# Patient Record
Sex: MALE | Race: WHITE | NOT HISPANIC OR LATINO | Employment: FULL TIME | URBAN - METROPOLITAN AREA
[De-identification: names, ages, dates, MRNs, and addresses within clinical notes are randomized per-mention and may not be internally consistent; named-entity substitution may affect disease eponyms.]

---

## 2017-01-26 ENCOUNTER — TRANSFERRED RECORDS (OUTPATIENT)
Dept: HEALTH INFORMATION MANAGEMENT | Facility: CLINIC | Age: 22
End: 2017-01-26

## 2017-10-23 ENCOUNTER — ALLIED HEALTH/NURSE VISIT (OUTPATIENT)
Dept: FAMILY MEDICINE | Facility: CLINIC | Age: 22
End: 2017-10-23
Payer: OTHER GOVERNMENT

## 2017-10-23 DIAGNOSIS — Z23 NEED FOR PROPHYLACTIC VACCINATION AND INOCULATION AGAINST INFLUENZA: Primary | ICD-10-CM

## 2017-10-23 PROCEDURE — 99207 ZZC NO CHARGE NURSE ONLY: CPT

## 2017-10-23 PROCEDURE — 90686 IIV4 VACC NO PRSV 0.5 ML IM: CPT

## 2017-10-23 PROCEDURE — 90471 IMMUNIZATION ADMIN: CPT

## 2017-10-23 NOTE — PROGRESS NOTES

## 2017-10-23 NOTE — MR AVS SNAPSHOT
"              After Visit Summary   10/23/2017    Tomer Mina    MRN: [id number]           Patient Information     Date Of Birth          1995        Visit Information        Provider Department      10/23/2017 2:15 PM ERICA HERRERA CMA/LPN [facility]        Today's Diagnoses     Need for prophylactic vaccination and inoculation against influenza    -  1       Follow-ups after your visit        Who to contact     If you have questions or need follow up information about today's clinic visit or your schedule please contact [facility] directly at [phone].  Normal or non-critical lab and imaging results will be communicated to you by [employer], letter or phone within 4 business days after the clinic has received the results. If you do not hear from us within 7 days, please contact the clinic through [employer] or phone. If you have a critical or abnormal lab result, we will notify you by phone as soon as possible.  Submit refill requests through [employer] or call your pharmacy and they will forward the refill request to us. Please allow 3 business days for your refill to be completed.          Additional Information About Your Visit        MyChart Information     [employer] lets you send messages to your doctor, view your test results, renew your prescriptions, schedule appointments and more. To sign up, go to [email or web address]/[email or web address] . Click on \"Log in\" on the left side of the screen, which will take you to the Welcome page. Then click on \"Sign up Now\" on the right side of the page.     You will be asked to enter the access code listed below, as well as some personal information. Please follow the directions to create your username and password.     Your access code is: [email or web address]  Expires: 2018  2:18 PM     Your access code will  in 90 days. If you need help or a new code, please call your [facility] or [phone].        Care EveryWhere ID     This is your Care EveryWhere ID. " This could be used by other organizations to access your [place] medical records  [id number]         Blood Pressure from Last 3 Encounters:   12/29/16 137/86   12/22/16 110/72    Weight from Last 3 Encounters:   12/29/16 158 lb (71.7 kg)   12/22/16 158 lb 1.6 oz (71.7 kg)              We Performed the Following     FLU VAC, SPLIT VIRUS IM > 3 YO (QUADRIVALENT) [71322]        Primary Care Provider Office Phone # Fax #    R Tommy Bronson [phone] [address] [facility] [address]        Equal Access to Services     [facility] : Hadii aad ku hadasho Soomaali, waaxda luqadaha, qaybta kaalmada adeegyada, waxrazia soto hayaan adecolten adler . So [facility] [phone].    ATENCIÓN: Si habla español, tiene a medley disposición servicios gratuitos de asistencia lingüística. Llame al [phone].    We comply with applicable federal civil rights laws and [place] laws. We do not discriminate on the basis of race, color, national origin, age, disability, sex, sexual orientation, or gender identity.            Thank you!     Thank you for choosing [facility]  for your care. Our goal is always to provide you with excellent care. Hearing back from our patients is one way we can continue to improve our services. Please take a few minutes to complete the written survey that you may receive in the mail after your visit with us. Thank you!             Your Updated Medication List - Protect others around you: Learn how to safely use, store and throw away your medicines at www.disposemymeds.org.          This list is accurate as of: 10/23/17  2:18 PM.  Always use your most recent med list.                   Brand Name Dispense Instructions for use Diagnosis    albuterol 108 (90 BASE) MCG/ACT Inhaler    PROAIR HFA/PROVENTIL HFA/VENTOLIN HFA    1 Inhaler    Inhale 2 puffs into the lungs every 4 hours as needed for shortness of breath / dyspnea or wheezing

## 2018-01-07 ENCOUNTER — HEALTH MAINTENANCE LETTER (OUTPATIENT)
Age: 23
End: 2018-01-07

## 2018-09-10 ENCOUNTER — OFFICE VISIT (OUTPATIENT)
Dept: URGENT CARE | Facility: URGENT CARE | Age: 23
End: 2018-09-10
Payer: OTHER GOVERNMENT

## 2018-09-10 VITALS
OXYGEN SATURATION: 97 % | HEART RATE: 86 BPM | WEIGHT: 167 LBS | SYSTOLIC BLOOD PRESSURE: 114 MMHG | BODY MASS INDEX: 23.62 KG/M2 | TEMPERATURE: 98.3 F | DIASTOLIC BLOOD PRESSURE: 80 MMHG

## 2018-09-10 DIAGNOSIS — H65.93 OME (OTITIS MEDIA WITH EFFUSION), BILATERAL: Primary | ICD-10-CM

## 2018-09-10 PROCEDURE — 99213 OFFICE O/P EST LOW 20 MIN: CPT | Performed by: PHYSICIAN ASSISTANT

## 2018-09-10 RX ORDER — AMOXICILLIN 875 MG
875 TABLET ORAL 2 TIMES DAILY
Qty: 20 TABLET | Refills: 0 | Status: SHIPPED | OUTPATIENT
Start: 2018-09-10 | End: 2024-05-28

## 2018-09-10 NOTE — MR AVS SNAPSHOT
"              After Visit Summary   9/10/2018    Tomer Mina    MRN: [id number]           Patient Information     Date Of Birth          1995        Visit Information        Provider Department      9/10/2018 2:15 PM Candy Spann PA-C [facility]        Today's Diagnoses     OME (otitis media with effusion), bilateral    -  1       Follow-ups after your visit        Who to contact     If you have questions or need follow up information about today's clinic visit or your schedule please contact [facility] directly at [phone].  Normal or non-critical lab and imaging results will be communicated to you by "[employer]hart, letter or phone within 4 business days after the clinic has received the results. If you do not hear from us within 7 days, please contact the clinic through "[employer]hart or phone. If you have a critical or abnormal lab result, we will notify you by phone as soon as possible.  Submit refill requests through [employer] or call your pharmacy and they will forward the refill request to us. Please allow 3 business days for your refill to be completed.          Additional Information About Your Visit        MyChart Information     [employer] lets you send messages to your doctor, view your test results, renew your prescriptions, schedule appointments and more. To sign up, go to [email or web address] . Click on \"Log in\" on the left side of the screen, which will take you to the Welcome page. Then click on \"Sign up Now\" on the right side of the page.     You will be asked to enter the access code listed below, as well as some personal information. Please follow the directions to create your username and password.     Your access code is: [email or web address]  Expires: 2019 11:56 AM     Your access code will  in 90 days. If you need help or a new code, please call your [place] clinic or [phone].        Care EveryWhere ID     This is your Care EveryWhere ID. This could be " used by other organizations to access your [place] medical records  [id number]        Your Vitals Were     Pulse Temperature Pulse Oximetry BMI (Body Mass Index)          86 98.3  F (36.8  C) 97% 23.62 kg/m2         Blood Pressure from Last 3 Encounters:   09/10/18 114/80   12/29/16 137/86   12/22/16 110/72    Weight from Last 3 Encounters:   09/10/18 167 lb (75.8 kg)   12/29/16 158 lb (71.7 kg)   12/22/16 158 lb 1.6 oz (71.7 kg)              Today, you had the following     No orders found for display         Today's Medication Changes          These changes are accurate as of 9/10/18 11:59 PM.  If you have any questions, ask your nurse or doctor.               Start taking these medicines.        Dose/Directions    amoxicillin 875 MG tablet   Commonly known as:  AMOXIL   Used for:  OME (otitis media with effusion), bilateral   Started by:  Candy Spann PA-C        Dose:  875 mg   Take 1 tablet (875 mg) by mouth 2 times daily   Quantity:  20 tablet   Refills:  0            Where to get your medicines      These medications were sent to [facility] Mat[facility] - [address]  [address]  [address], [place] [address]     Phone:  [phone]     amoxicillin 875 MG tablet                Primary Care Provider Office Phone # Fax #    R Tommy Bronson [phone] [address] [address] [address]        Equal Access to Services     [facility] Hospital[facility] AH: Hadii neftali barajaso Soglenroy, waaxda luqadaha, qaybta kaalmada adeegyada, wax brittany keating. So [facility] [phone].    ATENCIÓN: Si habla español, tiene a medley disposición servicios gratuitos de asistencia lingüística. Llame al [phone].    We comply with applicable federal civil rights laws and [place] laws. We do not discriminate on the basis of race, color, national origin, age, disability, sex, sexual orientation, or gender identity.            Thank you!     Thank  you for choosing [facility]  for your care. Our goal is always to provide you with excellent care. Hearing back from our patients is one way we can continue to improve our services. Please take a few minutes to complete the written survey that you may receive in the mail after your visit with us. Thank you!             Your Updated Medication List - Protect others around you: Learn how to safely use, store and throw away your medicines at www.disposemymeds.org.          This list is accurate as of 9/10/18 11:59 PM.  Always use your most recent med list.                   Brand Name Dispense Instructions for use Diagnosis    amoxicillin 875 MG tablet    AMOXIL    20 tablet    Take 1 tablet (875 mg) by mouth 2 times daily    OME (otitis media with effusion), bilateral

## 2018-09-10 NOTE — PROGRESS NOTES
SUBJECTIVE:  Tomer Mina is a 23 year old male who presents with bilateral ear pain, fullness and pressure that occurred rapidly earlier today.  Works for the [employer] and was performing rapid pressurization and had acute pain in the ear with R>L .  Sx have currently resolved.  Wants to make sure that no damage to ear and able to return to duties.  Did have mild cold sx recently.    Severity: moderate   Timing:sudden onset  Additional symptoms include negative other than stated above.  No bleeding from ear or drainage noted. Still feels plugged.      History of recurrent otitis: no    Generally healthy with no underlying medical issues.  Takes Vitamins daily.     History reviewed. No pertinent past medical history.  Current Outpatient Prescriptions   Medication Sig Dispense Refill     amoxicillin (AMOXIL) 875 MG tablet Take 1 tablet (875 mg) by mouth 2 times daily 20 tablet 0     Social History   Substance Use Topics     Smoking status: Never Smoker     Smokeless tobacco: Never Used     Alcohol use No       ROS:   Review of systems negative except as stated above.    OBJECTIVE:  /80  Pulse 86  Temp 98.3  F (36.8  C)  Wt 167 lb (75.8 kg)  SpO2 97%  BMI 23.62 kg/m2   EXAM:  The right TM is bulging, distorted light reflex, erythematous and TM intact with no perforation noted     The right auditory canal is normal and without drainage, edema or erythema  The left TM is erythematous and TM intact with no evidence for TM rupture  The left auditory canal is normal and without drainage, edema or erythema  Oropharynx exam is normal: no lesions, erythema, adenopathy or exudate.  GENERAL: no acute distress  EYES: EOMI,  PERRL, conjunctiva clear  NECK: supple, non-tender to palpation, no adenopathy noted  RESP: lungs clear to auscultation - no rales, rhonchi or wheezes  CV: regular rates and rhythm, normal S1 S2, no murmur noted  SKIN: no suspicious lesions or rashes     assessment/plan:  (H65.93) OME (otitis media  with effusion), bilateral  (primary encounter diagnosis)  Comment:   Plan: amoxicillin (AMOXIL) 875 MG tablet         Med as directed and signs of TM rupture discussed.  Avoid rapid pressurization until heals and note for work given.  Follow-up with PCP as needed

## 2018-09-10 NOTE — LETTER
[facility]  [address]  [address]  [place] [address] [phone]        September 10, 2018    REPORT OF WORK ABILITY    PATIENT DATA  Employee Name: Tomer Mina        : 1995   [id number]  Work related injury: YES  Today's date: September 10, 2018  Date of injury: 9/10/2018     PROVIDER EVALUATION: Please fill in as needed.  Please give copy to employee for employer.  1. Diagnosis: Bilateral OM R>L.  No signs of ruptured TM  2. Treatment: Amoxicillin 875 twice a day x 10 days  3. Medication: as above  NOTE: When ordering a medication, [place] Rules require Work Comp or WC on prescriptions.  4. Return to work date: May return today with the following: * Other: patient not to be in aircraft while any type of pressurization occurring.   . DURATION OF LIMITATIONS: until antibiotic complete      RESTRICTIONS: Unlimited unless listed.  Restrictions apply to home and leisure also.  If work within restrictions is not available, the employee is totally disabled.  Provider comments:   Medical Examiner: Candy Spann      License or registration: [id number]    Next appointment: As needed    CC: Employer, Managed Care Plan/Payor, Patient

## 2018-10-30 ENCOUNTER — ALLIED HEALTH/NURSE VISIT (OUTPATIENT)
Dept: NURSING | Facility: CLINIC | Age: 23
End: 2018-10-30
Payer: OTHER GOVERNMENT

## 2018-10-30 DIAGNOSIS — Z23 NEED FOR PROPHYLACTIC VACCINATION AND INOCULATION AGAINST INFLUENZA: Primary | ICD-10-CM

## 2018-10-30 PROCEDURE — 90471 IMMUNIZATION ADMIN: CPT

## 2018-10-30 PROCEDURE — 90686 IIV4 VACC NO PRSV 0.5 ML IM: CPT

## 2018-10-30 PROCEDURE — 99207 ZZC NO CHARGE NURSE ONLY: CPT

## 2018-10-30 NOTE — MR AVS SNAPSHOT
"              After Visit Summary   10/30/2018    Tomer Mina    MRN: [id number]           Patient Information     Date Of Birth          1995        Visit Information        Provider Department      10/30/2018 4:00 PM [facility] MEDICAL ASSISTANT [facility]        Today's Diagnoses     Need for prophylactic vaccination and inoculation against influenza    -  1       Follow-ups after your visit        Who to contact     If you have questions or need follow up information about today's clinic visit or your schedule please contact [facility] directly at [phone].  Normal or non-critical lab and imaging results will be communicated to you by [employer], letter or phone within 4 business days after the clinic has received the results. If you do not hear from us within 7 days, please contact the clinic through [employer] or phone. If you have a critical or abnormal lab result, we will notify you by phone as soon as possible.  Submit refill requests through [employer] or call your pharmacy and they will forward the refill request to us. Please allow 3 business days for your refill to be completed.          Additional Information About Your Visit        MyChart Information     [employer] lets you send messages to your doctor, view your test results, renew your prescriptions, schedule appointments and more. To sign up, go to [email or web address]/[email or web address] . Click on \"Log in\" on the left side of the screen, which will take you to the Welcome page. Then click on \"Sign up Now\" on the right side of the page.     You will be asked to enter the access code listed below, as well as some personal information. Please follow the directions to create your username and password.     Your access code is: [email or web address]  Expires: 2019 11:56 AM     Your access code will  in 90 days. If you need help or a new code, please call your [facility] or [phone].        Care EveryWhere ID     This is your Care " EveryWhere ID. This could be used by other organizations to access your [place] medical records  [id number]         Blood Pressure from Last 3 Encounters:   09/10/18 114/80   12/29/16 137/86   12/22/16 110/72    Weight from Last 3 Encounters:   09/10/18 167 lb (75.8 kg)   12/29/16 158 lb (71.7 kg)   12/22/16 158 lb 1.6 oz (71.7 kg)              We Performed the Following     FLU VACCINE, SPLIT VIRUS, IM (QUADRIVALENT) [11135]- >3 YRS     Vaccine Administration, Initial [70461]        Primary Care Provider Office Phone # Fax #    R Tommy Bronson [phone] [address] [id number]        Equal Access to Services     KAMILAH KELLEY : Lee barajaso Soomaali, waaxda luqadaha, qaybta kaalmada adeegyada, shiv adler . So [facility] [phone].    ATENCIÓN: Si habla español, tiene a medley disposición servicios gratuitos de asistencia lingüística. Llame al [phone].    We comply with applicable federal civil rights laws and [place] laws. We do not discriminate on the basis of race, color, national origin, age, disability, sex, sexual orientation, or gender identity.            Thank you!     Thank you for choosing [facility]  for your care. Our goal is always to provide you with excellent care. Hearing back from our patients is one way we can continue to improve our services. Please take a few minutes to complete the written survey that you may receive in the mail after your visit with us. Thank you!             Your Updated Medication List - Protect others around you: Learn how to safely use, store and throw away your medicines at www.disposemymeds.org.          This list is accurate as of 10/30/18  4:39 PM.  Always use your most recent med list.                   Brand Name Dispense Instructions for use Diagnosis    amoxicillin 875 MG tablet    AMOXIL    20 tablet    Take 1 tablet (875 mg) by mouth 2 times daily    OME (otitis  media with effusion), bilateral

## 2018-10-30 NOTE — PROGRESS NOTES
Prior to injection verified patient identity using patient's name and date of birth.  Due to injection administration, patient instructed to remain in clinic for 15 minutes  afterwards, and to report any adverse reaction to me immediately.      Injectable Influenza Immunization Documentation    1.  Is the person to be vaccinated sick today?   No    2. Does the person to be vaccinated have an allergy to a component   of the vaccine?   No  Egg Allergy Algorithm Link    3. Has the person to be vaccinated ever had a serious reaction   to influenza vaccine in the past?   No    4. Has the person to be vaccinated ever had Guillain-Barré syndrome?   No    Form completed by Katie Oates MA

## 2019-01-04 ENCOUNTER — ALLIED HEALTH/NURSE VISIT (OUTPATIENT)
Dept: FAMILY MEDICINE | Facility: CLINIC | Age: 24
End: 2019-01-04
Payer: OTHER GOVERNMENT

## 2019-01-04 DIAGNOSIS — Z11.1 VISIT FOR MANTOUX TEST: Primary | ICD-10-CM

## 2019-01-04 PROCEDURE — 86580 TB INTRADERMAL TEST: CPT

## 2019-01-04 PROCEDURE — 99207 ZZC NO CHARGE NURSE ONLY: CPT

## 2021-06-16 ENCOUNTER — APPOINTMENT (OUTPATIENT)
Dept: CT IMAGING | Facility: CLINIC | Age: 26
End: 2021-06-16
Attending: NURSE PRACTITIONER
Payer: COMMERCIAL

## 2021-06-16 ENCOUNTER — HOSPITAL ENCOUNTER (EMERGENCY)
Facility: CLINIC | Age: 26
Discharge: HOME | End: 2021-06-16
Attending: NURSE PRACTITIONER | Admitting: NURSE PRACTITIONER
Payer: COMMERCIAL

## 2021-06-16 VITALS
SYSTOLIC BLOOD PRESSURE: 141 MMHG | WEIGHT: 163.36 LBS | OXYGEN SATURATION: 100 % | TEMPERATURE: 99 F | BODY MASS INDEX: 23.11 KG/M2 | RESPIRATION RATE: 16 BRPM | HEART RATE: 88 BPM | DIASTOLIC BLOOD PRESSURE: 86 MMHG

## 2021-06-16 DIAGNOSIS — S70.00XA CONTUSION OF HIP REGION: ICD-10-CM
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
## 2021-06-16 DIAGNOSIS — S00.83XA CONTUSION OF FACE, INITIAL ENCOUNTER: ICD-10-CM

## 2021-06-16 DIAGNOSIS — T14.8XXA ABRASION: ICD-10-CM

## 2021-06-16 PROBLEM — J45.909 ASTHMA: Status: ACTIVE | Noted: 2021-06-16

## 2021-06-16 PROCEDURE — 70486 CT MAXILLOFACIAL W/O DYE: CPT

## 2021-06-16 PROCEDURE — 99284 EMERGENCY DEPT VISIT MOD MDM: CPT | Mod: 25

## 2021-06-16 RX ORDER — METHOCARBAMOL 500 MG/1
TABLET, FILM COATED ORAL
Qty: 24 TABLET | Refills: 0 | Status: SHIPPED | OUTPATIENT
Start: 2021-06-16 | End: 2024-05-28

## 2021-06-16 ASSESSMENT — ENCOUNTER SYMPTOMS
NUMBNESS: 0
VOMITING: 0
NAUSEA: 0
DIARRHEA: 0
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
## 2021-08-18 ENCOUNTER — ANCILLARY PROCEDURE (OUTPATIENT)
Dept: GENERAL RADIOLOGY | Facility: CLINIC | Age: 26
End: 2021-08-18
Attending: FAMILY MEDICINE
Payer: COMMERCIAL

## 2021-08-18 ENCOUNTER — OFFICE VISIT (OUTPATIENT)
Dept: URGENT CARE | Facility: URGENT CARE | Age: 26
End: 2021-08-18
Payer: OTHER MISCELLANEOUS

## 2021-08-18 VITALS
OXYGEN SATURATION: 98 % | DIASTOLIC BLOOD PRESSURE: 70 MMHG | RESPIRATION RATE: 16 BRPM | HEART RATE: 84 BPM | BODY MASS INDEX: 23.33 KG/M2 | WEIGHT: 164.9 LBS | TEMPERATURE: 98.3 F | SYSTOLIC BLOOD PRESSURE: 110 MMHG

## 2021-08-18 DIAGNOSIS — S99.912A ANKLE INJURY, LEFT, INITIAL ENCOUNTER: ICD-10-CM

## 2021-08-18 DIAGNOSIS — S93.402A SPRAIN OF LEFT ANKLE, UNSPECIFIED LIGAMENT, INITIAL ENCOUNTER: Primary | ICD-10-CM

## 2021-08-18 PROCEDURE — 99213 OFFICE O/P EST LOW 20 MIN: CPT | Performed by: FAMILY MEDICINE

## 2021-08-18 PROCEDURE — 73610 X-RAY EXAM OF ANKLE: CPT | Mod: LT | Performed by: RADIOLOGY

## 2021-08-18 PROCEDURE — 73630 X-RAY EXAM OF FOOT: CPT | Mod: LT | Performed by: RADIOLOGY

## 2021-08-18 NOTE — LETTER
[facility]  [address]  [place] [address]  [phone]      2021    RE:  Tomer Mina                                                                                                                                                       To whom it may concern:    Tomer Mina is under my professional care for a left ankle injury.  As part of his healing process, he needs to limit his standing to no more than 30 minutes at a time without a break.  He should spend 10 minutes sitting after a 30-minute period of standing.  These restrictions are in place for three more days.  After these , he can return to normal work duties.    Sincerely,        Annie Castillo MD    [facility]

## 2021-08-18 NOTE — PROGRESS NOTES
ASSESSMENT:    ICD-10-CM    1. Sprain of left ankle, unspecified ligament, initial encounter  S93.402A    2. Ankle injury, left, initial encounter  S99.912A XR Ankle Left G/E 3 Views     XR Foot Left G/E 3 Views     PLAN  Patient Instructions   Use ibuprofen 800 mg (4 OTC tablets) three times daily for the next 3 days.  Continue with ice on the ankle several times per day.  Keep it elevated when you're able to do so.    Use arnica gel topically 2-3 times per day.  This helps with swelling, bruising, and inflammation.  Available without prescription at places like [employer], [employer], etc.    Aircast splint for extra support.  Note written for work.    SUBJECTIVE  Tomer Mina is a 25 year old male who presents today with left ankle pain that occurred 2 day(s) ago.    The mechanism of injury includes: inversion strain. Pain was sudden onset.  Swelling was nearly instantaneous.  Therapies to improve symptoms include: ice, ibuprofen, rest and elevation  History of recurrent ankle injuries: no  Able to bear weight, though it is painful.      OBJECTIVE:  /70 (BP Location: Right arm, Patient Position: Chair, Cuff Size: Adult Regular)   Pulse 84   Temp 98.3  F (36.8  C) (Oral)   Resp 16   Wt 74.8 kg (164 lb 14.4 oz)   SpO2 98%   BMI 23.33 kg/m    EXAM: Patient appears alert,no apparent distress.  Ankle Exam: left    Inspection: significant swelling around the lateral malleolus and anteriorly    Palpation: tender over the medial malleolus, lateral malleolus and navicular.  No 5th MT tenderness.    Both doralis pedis and posterior tibial pulses intact    X-Ray: offical reading pending, my reading of the films are that there are no fractures in the foot or ankle series.

## 2021-08-18 NOTE — PATIENT INSTRUCTIONS
Use ibuprofen 800 mg (4 OTC tablets) three times daily for the next 3 days.  Continue with ice on the ankle several times per day.  Keep it elevated when you're able to do so.    Use arnica gel topically 2-3 times per day.  This helps with swelling, bruising, and inflammation.  Available without prescription at places like [facility], [employer], [employer], etc.

## 2022-09-22 ENCOUNTER — ANCILLARY PROCEDURE (OUTPATIENT)
Dept: GENERAL RADIOLOGY | Facility: CLINIC | Age: 27
End: 2022-09-22
Attending: NURSE PRACTITIONER
Payer: OTHER GOVERNMENT

## 2022-09-22 ENCOUNTER — OFFICE VISIT (OUTPATIENT)
Dept: URGENT CARE | Facility: URGENT CARE | Age: 27
End: 2022-09-22

## 2022-09-22 VITALS
BODY MASS INDEX: 23.34 KG/M2 | OXYGEN SATURATION: 100 % | DIASTOLIC BLOOD PRESSURE: 64 MMHG | TEMPERATURE: 98.3 F | WEIGHT: 165 LBS | HEART RATE: 95 BPM | SYSTOLIC BLOOD PRESSURE: 120 MMHG

## 2022-09-22 DIAGNOSIS — M25.561 ACUTE PAIN OF RIGHT KNEE: Primary | ICD-10-CM

## 2022-09-22 DIAGNOSIS — M25.561 ACUTE PAIN OF RIGHT KNEE: ICD-10-CM

## 2022-09-22 PROCEDURE — 99213 OFFICE O/P EST LOW 20 MIN: CPT | Performed by: NURSE PRACTITIONER

## 2022-09-22 PROCEDURE — 73562 X-RAY EXAM OF KNEE 3: CPT | Mod: TC | Performed by: RADIOLOGY

## 2022-09-22 RX ORDER — NAPROXEN SODIUM 220 MG
220 TABLET ORAL 2 TIMES DAILY WITH MEALS
COMMUNITY
Start: 2022-09-22 | End: 2024-05-28

## 2022-09-22 NOTE — PROGRESS NOTES
Assessment & Plan     Acute pain of right knee  Xray interpreted as negative in the clinic.  Pending radiologist review.    Aleve BID x 1 week  F/u with PCP if persists or worsens  - XR Knee Right 3 Views  - naproxen sodium (ANAPROX) 220 MG tablet      Return in about 1 week (around 9/29/2022) for with regular provider if symptoms persist.    YOBANI Klein CNP  [facility] SYMONE Jeff is a 27 year old male who presents to clinic today for the following health issues:  Chief Complaint   Patient presents with     Urgent Care     X1 Month Right knee pain, locking up when sitting for period of time, extending leg has sharp pain radiating up, painful to walk, during sleep if knee is bent waking up with pain  Hx of sports and activity no injury reported    No therapies tried      HPI    MS Injury/Pain    Onset of symptoms was 1 month(s) ago.  Location: right knee  Context:       The injury happened while uncertain      Mechanism: ?      Patient experienced delayed pain, was able to bear weight directly after injury, no deformity was noted by the patient  Course of symptoms is waxing and waning.    Severity moderate  Current and Associated symptoms: Pain and Decreased range of motion  Denies  Swelling, Bruising, Warmth and Redness  Aggravating Factors: walking, weight-bearing, twisting and flexion/extension  Therapies to improve symptoms include: ice and ibuprofen  This is the first time this type of problem has occurred for this patient.     Review of Systems  Constitutional, HEENT, cardiovascular, pulmonary, GI, , musculoskeletal, neuro, skin, endocrine and psych systems are negative, except as otherwise noted.      Objective    /64   Pulse 95   Temp 98.3  F (36.8  C) (Tympanic)   Wt 74.8 kg (165 lb)   SpO2 100%   BMI 23.34 kg/m    Physical Exam   GENERAL: healthy, alert and no distress  RESP: lungs clear to auscultation - no rales, rhonchi or wheezes  CV:  regular rate and rhythm, normal S1 S2, no S3 or S4, no murmur, click or rub, no peripheral edema and peripheral pulses strong  MS: no gross musculoskeletal defects noted, no edema  SKIN: no suspicious lesions or rashes    Results for orders placed or performed in visit on 09/22/22   XR Knee Right 3 Views     Status: None    Narrative    XR KNEE RIGHT 3 VIEWS 9/22/2022 1:14 PM    HISTORY: Acute pain of right knee    COMPARISON: None.      Impression    IMPRESSION: No fracture. No effusion. No degenerative changes.    JANNETTE HOUSE MD         SYSTEM ID:  [id number]

## 2024-05-28 ENCOUNTER — OFFICE VISIT (OUTPATIENT)
Dept: FAMILY MEDICINE | Facility: CLINIC | Age: 29
End: 2024-05-28
Payer: OTHER GOVERNMENT

## 2024-05-28 VITALS
DIASTOLIC BLOOD PRESSURE: 84 MMHG | BODY MASS INDEX: 23.34 KG/M2 | WEIGHT: 163 LBS | SYSTOLIC BLOOD PRESSURE: 120 MMHG | HEIGHT: 70 IN | RESPIRATION RATE: 16 BRPM | HEART RATE: 88 BPM | TEMPERATURE: 98.1 F | OXYGEN SATURATION: 97 %

## 2024-05-28 DIAGNOSIS — G47.00 PERSISTENT INSOMNIA: ICD-10-CM

## 2024-05-28 DIAGNOSIS — F41.9 ANXIETY: ICD-10-CM

## 2024-05-28 PROCEDURE — 99214 OFFICE O/P EST MOD 30 MIN: CPT | Performed by: FAMILY MEDICINE

## 2024-05-28 ASSESSMENT — ASTHMA QUESTIONNAIRES
ACT_TOTALSCORE: 25
QUESTION_2 LAST FOUR WEEKS HOW OFTEN HAVE YOU HAD SHORTNESS OF BREATH: NOT AT ALL
ACT_TOTALSCORE: 25
QUESTION_5 LAST FOUR WEEKS HOW WOULD YOU RATE YOUR ASTHMA CONTROL: COMPLETELY CONTROLLED
QUESTION_4 LAST FOUR WEEKS HOW OFTEN HAVE YOU USED YOUR RESCUE INHALER OR NEBULIZER MEDICATION (SUCH AS ALBUTEROL): NOT AT ALL
QUESTION_1 LAST FOUR WEEKS HOW MUCH OF THE TIME DID YOUR ASTHMA KEEP YOU FROM GETTING AS MUCH DONE AT WORK, SCHOOL OR AT HOME: NONE OF THE TIME
QUESTION_3 LAST FOUR WEEKS HOW OFTEN DID YOUR ASTHMA SYMPTOMS (WHEEZING, COUGHING, SHORTNESS OF BREATH, CHEST TIGHTNESS OR PAIN) WAKE YOU UP AT NIGHT OR EARLIER THAN USUAL IN THE MORNING: NOT AT ALL

## 2024-05-28 ASSESSMENT — ANXIETY QUESTIONNAIRES
2. NOT BEING ABLE TO STOP OR CONTROL WORRYING: NOT AT ALL
7. FEELING AFRAID AS IF SOMETHING AWFUL MIGHT HAPPEN: NOT AT ALL
5. BEING SO RESTLESS THAT IT IS HARD TO SIT STILL: NOT AT ALL
7. FEELING AFRAID AS IF SOMETHING AWFUL MIGHT HAPPEN: NOT AT ALL
GAD7 TOTAL SCORE: 0
GAD7 TOTAL SCORE: 0
3. WORRYING TOO MUCH ABOUT DIFFERENT THINGS: NOT AT ALL
4. TROUBLE RELAXING: NOT AT ALL
1. FEELING NERVOUS, ANXIOUS, OR ON EDGE: NOT AT ALL
GAD7 TOTAL SCORE: 0
6. BECOMING EASILY ANNOYED OR IRRITABLE: NOT AT ALL
8. IF YOU CHECKED OFF ANY PROBLEMS, HOW DIFFICULT HAVE THESE MADE IT FOR YOU TO DO YOUR WORK, TAKE CARE OF THINGS AT HOME, OR GET ALONG WITH OTHER PEOPLE?: NOT DIFFICULT AT ALL
IF YOU CHECKED OFF ANY PROBLEMS ON THIS QUESTIONNAIRE, HOW DIFFICULT HAVE THESE PROBLEMS MADE IT FOR YOU TO DO YOUR WORK, TAKE CARE OF THINGS AT HOME, OR GET ALONG WITH OTHER PEOPLE: NOT DIFFICULT AT ALL

## 2024-05-28 ASSESSMENT — PATIENT HEALTH QUESTIONNAIRE - PHQ9
SUM OF ALL RESPONSES TO PHQ QUESTIONS 1-9: 3
SUM OF ALL RESPONSES TO PHQ QUESTIONS 1-9: 3
10. IF YOU CHECKED OFF ANY PROBLEMS, HOW DIFFICULT HAVE THESE PROBLEMS MADE IT FOR YOU TO DO YOUR WORK, TAKE CARE OF THINGS AT HOME, OR GET ALONG WITH OTHER PEOPLE: NOT DIFFICULT AT ALL

## 2024-05-28 NOTE — PROGRESS NOTES
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    (G47.00) Persistent insomnia  Comment: We discussed sleep hygiene .  We discussed options for over the counter melatonin or additional medication if symptoms fail to improve   Plan: Adult Mental Health  Referral          Patient also bring paperwork .  Looking for statement stating health condition contributed by her work .  Explained unable to fill as cannot explain if it is due to his shift work or due to prior experience .  Requesting specialist consultation placed .        (F41.9) Anxiety  Comment:   Plan: Adult Mental Health  Referral   Discussed medication deferred by patient .      Kalen Jeff is a 28 year old, presenting for the following health issues:  Insomnia    [place] [employer]        5/28/2024     2:57 PM   Additional Questions   Roomed by Alicia NUÑEZ     History of Present Illness       Reason for visit:  Sleep issues    He eats 0-1 servings of fruits and vegetables daily.He consumes 1 sweetened beverage(s) daily.He exercises with enough effort to increase his heart rate 30 to 60 minutes per day.  He exercises with enough effort to increase his heart rate 3 or less days per week.   He is taking medications regularly.         Insomnia  Onset/Duration: 4-5 years  Description:   Frequency of insomnia:  several times a week  Time to fall asleep (sleep latency): 1 hour  Middle of night awakening:  YES  Early morning awakening:  YES- occ  Progression of Symptoms:  same  Accompanying Signs & Symptoms:  Daytime sleepiness/napping: YES  Excessive snoring/apnea: No  Restless legs: No  Waking to urinate: No  Chronic pain:  No  Depression symptoms (if yes, do PHQ9): No  Anxiety symptoms (if yes, do ROSANNE-7): No  History:  Prior Insomnia: No  New stressful situation: No  Precipitating factors:   Caffeine intake: YES- 1 cup of coffee per day  OTC decongestants:  "No  Any new medications: No  Alleviating factors:  Self medicating (alcohol, etc.):  No  Stress-reduction (exercise, yoga, meditation etc): YES- exercise 2 times er week  Therapies tried and outcome: none        Review of Systems  CONSTITUTIONAL: NEGATIVE for fever, chills, change in weight  INTEGUMENTARY/SKIN: NEGATIVE for worrisome rashes, moles or lesions  EYES: NEGATIVE for vision changes or irritation  ENT/MOUTH: NEGATIVE for ear, mouth and throat problems  CV: NEGATIVE for chest pain, palpitations or peripheral edema  : NEGATIVE for frequency, dysuria, or hematuria  MUSCULOSKELETAL: NEGATIVE for significant arthralgias or myalgia  HEME: NEGATIVE for bleeding problems  PSYCHIATRIC: anxiety , poor sleep       Objective    /84 (Cuff Size: Adult Regular)   Pulse 88   Temp 98.1  F (36.7  C) (Oral)   Resp 16   Ht 1.784 m (5' 10.25\")   Wt 73.9 kg (163 lb)   SpO2 97%   BMI 23.22 kg/m    Body mass index is 23.22 kg/m .  Physical Exam  Pulmonary:      Effort: Pulmonary effort is normal.   Abdominal:      General: Abdomen is flat.   Skin:     General: Skin is warm.   Neurological:      General: No focal deficit present.   Psychiatric:         Mood and Affect: Mood normal.      Comments: Anxiety   Making good eye contact .  Answering all the questions appropriately .             Signed Electronically by: Tamela Ace MD    "

## 2024-07-13 ENCOUNTER — HEALTH MAINTENANCE LETTER (OUTPATIENT)
Age: 29
End: 2024-07-13

## 2025-07-19 ENCOUNTER — HEALTH MAINTENANCE LETTER (OUTPATIENT)
Age: 30
End: 2025-07-19